# Patient Record
Sex: MALE | Race: BLACK OR AFRICAN AMERICAN | Employment: UNEMPLOYED | URBAN - METROPOLITAN AREA
[De-identification: names, ages, dates, MRNs, and addresses within clinical notes are randomized per-mention and may not be internally consistent; named-entity substitution may affect disease eponyms.]

---

## 2022-10-29 ENCOUNTER — HOSPITAL ENCOUNTER (EMERGENCY)
Age: 32
Discharge: HOME | End: 2022-10-29
Payer: MEDICAID

## 2022-10-29 VITALS
DIASTOLIC BLOOD PRESSURE: 95 MMHG | RESPIRATION RATE: 16 BRPM | OXYGEN SATURATION: 100 % | HEART RATE: 68 BPM | TEMPERATURE: 98.2 F | SYSTOLIC BLOOD PRESSURE: 139 MMHG

## 2022-10-29 DIAGNOSIS — R21 RASH AND OTHER NONSPECIFIC SKIN ERUPTION: Primary | ICD-10-CM

## 2022-10-29 PROCEDURE — 99282 EMERGENCY DEPT VISIT SF MDM: CPT

## 2022-10-29 ASSESSMENT — PAIN DESCRIPTION - LOCATION: LOCATION: ARM

## 2022-10-29 ASSESSMENT — PAIN SCALES - GENERAL: PAINLEVEL_OUTOF10: 4

## 2022-10-29 ASSESSMENT — PAIN DESCRIPTION - FREQUENCY: FREQUENCY: INTERMITTENT

## 2022-10-29 NOTE — DISCHARGE INSTRUCTIONS
It is unclear what is causing your rash today. I am concerned that it is related to an underlying systemic problem. Call and make an appointment to follow-up with dermatology for further evaluation soon as possible.

## 2022-10-29 NOTE — ED NOTES
#[id number]. Discharge packet reviewed with pt, including follow up care. Pt verbalized understanding and all questions answered.       Nelly Aragon RN  10/29/22 2240

## 2022-10-30 NOTE — ED PROVIDER NOTES
[address] ENCOUNTER        Pt Name: Muriel Vanegas  MRN: [id number]  [place] 1990  Date of evaluation: 10/29/2022  Provider: XAVIER Friend CNP  PCP: No primary care provider on file. ROBERTA. I have evaluated this patient. My supervising physician was available for consultation. Triage CHIEF COMPLAINT       Chief Complaint   Patient presents with    Rash     X6 months         HISTORY OF PRESENT ILLNESS      Chief Complaint: Rash    Muriel Vanegas is a 27 y.o. male who presents for evaluation of a progressive rash over his entire body that has been present for 6 months. Patient is [place] and reports that he came to the [address] [place] [address],[address] [facility] in February 2022. He migrated through [facility] for 2 years from [facility] on his way to the [address] [place] [address],[address] [place] [address]. He states that he had to crawl through bushes while migrating and got some scratches on his skin and was unsure if this could have contributed. He was evaluated for these when he first came to the [address] [place] [address],[address] [place] [address] in [place]. He also underwent testing for transmissible diseases and reports he specifically tested negative for HIV. He has no history of STIs. He states that the lesions initially start as ulcerated plaques and then progressed into dry, hypopigmented nodules. He has them diffusely over his extremities, buttocks, trunk, and is now developing them on his nose. He reports he is also developed similar lesions inside his nasal mucosa that frequently bleed and are uncomfortable. He has not received insurance since he has been in [place] and thus has not been seen by any primary care or dermatology. He denies any other systemic symptoms. Nursing Notes were all reviewed and agreed with or any disagreements were addressed in the HPI.     REVIEW OF SYSTEMS     Constitutional:   Denies fever, chills, weight loss or weakness   HENT:   Denies sore throat or ear pain   Cardiovascular:   Denies chest pain, palpitations   Respiratory: Denies cough or shortness of breath    GI:   Denies abdominal pain, nausea, vomiting, or diarrhea  :  Denies any urinary symptoms   Musculoskeletal:   Denies neck, back, or extremity pain  Skin:   Denies rash  Neurologic:  Denies headache, focal weakness or sensory changes   Endocrine:  Denies polyuria or polydypsia   Lymphatic:  Denies swollen glands     PAST MEDICAL HISTORY   History reviewed. No pertinent past medical history. SURGICAL HISTORY   History reviewed. No pertinent surgical history. CURRENTMEDICATIONS     There are no discharge medications for this patient. ALLERGIES     Patient has no allergy information on record. FAMILYHISTORY     History reviewed. No pertinent family history. SOCIAL HISTORY       Social History     Socioeconomic History    Marital status: Single     Spouse name: None    Number of children: None    Years of education: None    Highest education level: None   Tobacco Use    Smoking status: Never    Smokeless tobacco: Never   Substance and Sexual Activity    Alcohol use: Never       SCREENINGS           PHYSICAL EXAM       ED Triage Vitals [10/29/22 1553]   BP Temp Temp Source Heart Rate Resp SpO2 Height Weight   (!) 141/87 98.2 °F (36.8 °C) Oral 70 16 100 % -- --      Constitutional:  Well developed, Well nourished. No distress  HENT:  Normocephalic, Atraumatic. Moist mucus membranes. There is hypertrophied tissue and bilateral nasal mucosa that is friable, scabbed, with evidence of recent bleeding. There is no obstruction of the nasal passages. No mucus drainage. Neck/Lymphatics: supple, no JVD, no swollen nodes over neck or supraclavicular area. Cardiovascular:   RRR,  no murmurs/rubs/gallops. Respiratory:   Nonlabored breathing. Normal breath sounds, No wheezing  Abdomen: Bowel sounds normal, Soft, No tenderness, no masses. Musculoskeletal:  There is no peripheral edema.   Bilateral upper and lower extremity ROM intact without pain or obvious deficit  Integument: There are scattered hypopigmented, dry nodular lesions noted in clusters over bilateral upper extremities as well as lower extremities, clustered most densely near the ankles, dense cluster on the buttocks, and scattered lesions across the trunk. None of these appear ulcerated. There are 2 small lesions starting on the surface of the tip of the nose. There is no erythema or exudate. Neurologic:  Alert & oriented , No focal deficits noted. Sensation intact. Psychiatric:  Affect normal, Mood normal.     DIAGNOSTIC RESULTS   LABS:    Labs Reviewed - No data to display    When ordered, only abnormal lab results are displayed. All other labs were within normal range or not returned as of this dictation. EKG: When ordered, EKG's are interpreted by the Emergency Department Physician in the absence of a cardiologist.  Please see their note for interpretation of EKG. RADIOLOGY:   Non-plain film images such as CT, Ultrasound and MRI are read by the radiologist. Plain radiographic images are visualized and preliminarily interpreted by the  ED Provider with the below findings:    Interpretation perthe Radiologist below, if available at the time of this note:    No orders to display     No results found. PROCEDURES   Unless otherwise noted below, none         CRITICAL CARE   CRITICAL CARE NOTE:   N/A    CONSULTS:  None      EMERGENCY DEPARTMENT COURSE and MDM:   Vitals:    Vitals:    10/29/22 1553 10/29/22 1632   BP: (!) 141/87 (!) 139/95   Pulse: 70 68   Resp: 16    Temp: 98.2 °F (36.8 °C)    TempSrc: Oral    SpO2: 100% 100%       Patient was given thefollowing medications:  Medications - No data to display      Is this patient to be included in the SEP-1 Core Measure due to severe sepsis or septic shock? No   Exclusion criteria - the patient is NOT to be included for SEP-1 Core Measure due to: Infection is not suspected    MDM:  Patient presents as above. Emergent etiologies considered. Patient seen and examined. Work-up initiated secondary to presentation, physical exam findings, vital signs and medical chart review. In brief, patient presents for evaluation of a rash for the last 6 months. Advised patient that it is unclear what is causing this rash. I suspect that there is an underlying systemic or autoimmune pathology. Advised patient that he would need to be seen by dermatology for further evaluation and possible sampling of the lesions. He was seen by registration and given information to consult financial services for [facility] application. Provided him multiple options for dermatology follow-up as well as primary care. Patient verbalized understanding and agreement and felt comfortable with discharge at this time. CLINICAL IMPRESSION      1. Rash and other nonspecific skin eruption          DISPOSITION/PLAN   DISPOSITION Decision To Discharge 10/29/2022 05:07:16 PM      PATIENT REFERREDTO:  [facility] - ADULT  Sunil Deborah [address]  [phone]    Call to schedule an appointment immediately. [address] Dermatology  [facility] [phone]  [phone]  Schedule an appointment as soon as possible for a visit       Parvez Khan DO  [address]  [phone]    Schedule an appointment as soon as possible for a visit       DISCHARGE MEDICATIONS:  There are no discharge medications for this patient. DISCONTINUED MEDICATIONS:  There are no discharge medications for this patient.              (Please note that portions ofthis note were completed with a voice recognition program.  Efforts were made to edit the dictations but occasionally words are mis-transcribed.)    XAVIER Briceño CNP (electronically signed)             XAVIER Scott CNP  10/30/22 1068

## 2022-11-01 ENCOUNTER — HOSPITAL ENCOUNTER (EMERGENCY)
Age: 32
Discharge: HOME | End: 2022-11-02
Payer: MEDICAID

## 2022-11-01 VITALS
DIASTOLIC BLOOD PRESSURE: 65 MMHG | TEMPERATURE: 98.5 F | RESPIRATION RATE: 16 BRPM | SYSTOLIC BLOOD PRESSURE: 138 MMHG | OXYGEN SATURATION: 99 % | HEART RATE: 78 BPM

## 2022-11-01 DIAGNOSIS — R21 RASH AND OTHER NONSPECIFIC SKIN ERUPTION: Primary | ICD-10-CM

## 2022-11-01 LAB
ANION GAP SERPL CALCULATED.3IONS-SCNC: 11 MMOL/L (ref 4–16)
BASOPHILS ABSOLUTE: 0 K/CU MM
BASOPHILS RELATIVE PERCENT: 0.4 % (ref 0–1)
BUN BLDV-MCNC: 11 MG/DL (ref 6–23)
CALCIUM SERPL-MCNC: 9.3 MG/DL (ref 8.3–10.6)
CHLORIDE BLD-SCNC: 103 MMOL/L (ref 99–110)
CO2: 23 MMOL/L (ref 21–32)
CREAT SERPL-MCNC: 0.9 MG/DL (ref 0.9–1.3)
DIFFERENTIAL TYPE: ABNORMAL
EOSINOPHILS ABSOLUTE: 0.1 K/CU MM
EOSINOPHILS RELATIVE PERCENT: 2.1 % (ref 0–3)
GFR SERPL CREATININE-BSD FRML MDRD: >60 ML/MIN/1.73M2
GLUCOSE BLD-MCNC: 85 MG/DL (ref 70–99)
HCT VFR BLD CALC: 42.9 % (ref 42–52)
HEMOGLOBIN: 14 GM/DL (ref 13.5–18)
HIV SCREEN: NON REACTIVE
IMMATURE NEUTROPHIL %: 0.2 % (ref 0–0.43)
LYMPHOCYTES ABSOLUTE: 1.7 K/CU MM
LYMPHOCYTES RELATIVE PERCENT: 35.5 % (ref 24–44)
MCH RBC QN AUTO: 26.5 PG (ref 27–31)
MCHC RBC AUTO-ENTMCNC: 32.6 % (ref 32–36)
MCV RBC AUTO: 81.1 FL (ref 78–100)
MONOCYTES ABSOLUTE: 0.5 K/CU MM
MONOCYTES RELATIVE PERCENT: 11.2 % (ref 0–4)
NUCLEATED RBC %: 0 %
PDW BLD-RTO: 14.6 % (ref 11.7–14.9)
PLATELET # BLD: 213 K/CU MM (ref 140–440)
PMV BLD AUTO: 12 FL (ref 7.5–11.1)
POTASSIUM SERPL-SCNC: 3.9 MMOL/L (ref 3.5–5.1)
RAPID INFLUENZA  B AGN: NEGATIVE
RAPID INFLUENZA A AGN: NEGATIVE
RBC # BLD: 5.29 M/CU MM (ref 4.6–6.2)
SARS-COV-2, NAAT: NOT DETECTED
SEGMENTED NEUTROPHILS ABSOLUTE COUNT: 2.4 K/CU MM
SEGMENTED NEUTROPHILS RELATIVE PERCENT: 50.6 % (ref 36–66)
SODIUM BLD-SCNC: 137 MMOL/L (ref 135–145)
SOURCE: NORMAL
TOTAL IMMATURE NEUTOROPHIL: 0.01 K/CU MM
TOTAL NUCLEATED RBC: 0 K/CU MM
WBC # BLD: 4.8 K/CU MM (ref 4–10.5)

## 2022-11-01 PROCEDURE — 99283 EMERGENCY DEPT VISIT LOW MDM: CPT | Performed by: PHYSICIAN ASSISTANT

## 2022-11-01 PROCEDURE — 87804 INFLUENZA ASSAY W/OPTIC: CPT

## 2022-11-01 PROCEDURE — 80048 BASIC METABOLIC PNL TOTAL CA: CPT

## 2022-11-01 PROCEDURE — 85025 COMPLETE CBC W/AUTO DIFF WBC: CPT

## 2022-11-01 PROCEDURE — 87389 HIV-1 AG W/HIV-1&-2 AB AG IA: CPT

## 2022-11-01 PROCEDURE — 87635 SARS-COV-2 COVID-19 AMP PRB: CPT

## 2022-11-02 NOTE — ED PROVIDER NOTES
Triage Chief Complaint:   Nasal Congestion and Rash    [place]:  Eric Silvestre is a 27 y.o. male that presents A for rash. Context is is pt has had a rash x 8 months. From [place] originally however developed rash in [place]. [place] the [place]. States the rash will peel and then pop up elsewhere. Endorses itching but no pain. No contacts with said rash. Was seen in [place] and Rx Bactroban with little effect. No involvement of palms, soles, mouth, or genitals, or anus. No new medications  No new foods  Immunizations are up-to-date      ROS:  REVIEW OF SYSTEMS    Constitutional:  Denies fever, chills, weight loss or weakness   HENT:  Denies sore throat or ear pain   Cardiovascular:  Denies chest pain, palpitations   Respiratory:  Denies cough or shortness of breath    GI:  Denies abdominal pain, nausea, vomiting, or diarrhea    Musculoskeletal:  Denies back pain,   Skin:  + rash  Neurologic:  Denies headache, focal weakness or sensory changes   Endocrine:  Denies polyuria or polydypsia   Lymphatic:  Denies swollen glands     All other review of systems are negative  See HPI and nursing notes for additional information       History reviewed. No pertinent past medical history. History reviewed. No pertinent surgical history. History reviewed. No pertinent family history.   Social History     Socioeconomic History    Marital status: Single     Spouse name: Not on file    Number of children: Not on file    Years of education: Not on file    Highest education level: Not on file   Occupational History    Not on file   Tobacco Use    Smoking status: Never    Smokeless tobacco: Never   Substance and Sexual Activity    Alcohol use: Never    Drug use: Not on file    Sexual activity: Not on file   Other Topics Concern    Not on file   Social History Narrative    Not on file     Social Determinants of Health     Financial Resource Strain: Not on file   Food Insecurity: Not on file   Transportation Needs: Not on file   Physical Activity: Not on file   Stress: Not on file   Social Connections: Not on file   Intimate Partner Violence: Not on file   Housing Stability: Not on file     No current facility-administered medications for this encounter. Current Outpatient Medications   Medication Sig Dispense Refill    mupirocin (BACTROBAN) 2 % ointment Apply topically 3 times daily. 1 g 0     No Known Allergies    Nursing Notes Reviewed    Physical Exam:  ED Triage Vitals [11/01/22 9237]   Enc Vitals Group      BP (!) 142/70      Heart Rate 74      Resp 18      Temp 98.5 °F (36.9 °C)      Temp Source Oral      SpO2 99 %      Weight       Height       Head Circumference       Peak Flow       Pain Score       Pain Loc       Pain Edu? Excl. in [address]? General :Patient is awake alert oriented person place and time no acute distress nontoxic appearing  HEENT: Pupils are equally round and reactive to light extraocular motors are intact conjunctivae clear sclerae white there is no injection no icterus. Nose without any rhinorrhea or epistaxis. Oral mucosa is moist no exudate buccal mucosa shows no ulcerations. Neck: Neck is supple full range of motion  Cardiac: Heart regular rate rhythm no murmurs rubs clicks or gallops  Lungs: Lungs are clear to auscultation there is no wheezing rhonchi or rales. There is no use of accessory muscles no nasal flaring identified. Abdomen: Abdomen is soft nontender nondistended. There is no firm or pulsatile masses no rebound rigidity or guarding negative Richey's negative McBurney, no peritoneal signs  Suprapubic:  there is no tenderness to palpation over the external bladder   Musculoskeletal: 5 out of 5 strength in all 4 extremities full flexion extension abduction and adduction supination pronation of all extremities and all digits. No obvious muscle atrophy is noted. No focal muscle deficits are appreciated  Neuro:  Motor intact sensory intact cranial nerves II through XII are intact level of consciousness is normal   Dermatology: Skin is warm and dry there is no obvious abscesses or lacerations  Rash: along pt bilat UE, Le, Nose, back and trunk: diffuse lesional rash. Each lesion between 0.5-2cm in diameter, highly keratonized and scabbed with dome shapes. No surrounding edema or erythema. Some of the lesions which have dacia deroofed reveal cavernous wounds without bleeding or discharge. Within thenares there are noted multiple small scabs. Lymphatic: There is no submandibular or cervical adenopathy appreciated.   Psych: Mentation is grossly normal cognition is grossly normal. Affect is appropriate      I have reviewed and interpreted all of the currently available lab results from this visit (if applicable):  Results for orders placed or performed during the hospital encounter of 11/01/22   Rapid Flu Swab    Specimen: Nasopharyngeal   Result Value Ref Range    Rapid Influenza A Ag NEGATIVE NEGATIVE    Rapid Influenza B Ag NEGATIVE NEGATIVE   COVID-19, Rapid    Specimen: Nasopharyngeal   Result Value Ref Range    Source UNKNOWN     SARS-CoV-2, NAAT NOT DETECTED NOT DETECTED   CBC with Auto Differential   Result Value Ref Range    WBC 4.8 4.0 - 10.5 K/CU MM    RBC 5.29 4.6 - 6.2 M/CU MM    Hemoglobin 14.0 13.5 - 18.0 GM/DL    Hematocrit 42.9 42 - 52 %    MCV 81.1 78 - 100 FL    MCH 26.5 (L) 27 - 31 PG    MCHC 32.6 32.0 - 36.0 %    RDW 14.6 11.7 - 14.9 %    Platelets 114 195 - 801 K/CU MM    MPV 12.0 (H) 7.5 - 11.1 FL    Differential Type AUTOMATED DIFFERENTIAL     Segs Relative 50.6 36 - 66 %    Lymphocytes % 35.5 24 - 44 %    Monocytes % 11.2 (H) 0 - 4 %    Eosinophils % 2.1 0 - 3 %    Basophils % 0.4 0 - 1 %    Segs Absolute 2.4 K/CU MM    Lymphocytes Absolute 1.7 K/CU MM    Monocytes Absolute 0.5 K/CU MM    Eosinophils Absolute 0.1 K/CU MM    Basophils Absolute 0.0 K/CU MM    Nucleated RBC % 0.0 %    Total Nucleated RBC 0.0 K/CU MM    Total Immature Neutrophil 0.01 K/CU MM    Immature Neutrophil % 0.2 0 - 0.43 %   Basic Metabolic Panel   Result Value Ref Range    Sodium 137 135 - 145 MMOL/L    Potassium 3.9 3.5 - 5.1 MMOL/L    Chloride 103 99 - 110 mMol/L    CO2 23 21 - 32 MMOL/L    Anion Gap 11 4 - 16    BUN 11 6 - 23 MG/DL    Creatinine 0.9 0.9 - 1.3 MG/DL    Est, Glom Filt Rate >60 >60 mL/min/1.73m2    Glucose 85 70 - 99 MG/DL    Calcium 9.3 8.3 - 10.6 MG/DL   HIV Screen   Result Value Ref Range    HIV Screen NON REACTIVE NON REACTIVE      Radiographs (if obtained):  [] The following radiograph was interpreted by myself in the absence of a radiologist:   [] Radiologist's Report Reviewed:  No orders to display       EKG (if obtained):   Please See Note of attending physician for EKG interpretation. Chart review shows recent radiograph(s):  No results found. MDM:   Patient presents today with rash of unknown etiology. No constitutional sx. I have low suspicion of emergent process or systemic involement. Rash is chronic (8+ months) will provide derm referral        Differential diagnosis: Vasculitis, bacterial skin infection, viral rash, systemic infectious rash, anaphylaxis, urticaria, exposure to poison ivy or poison oak or other sources of contact dermatitis, vasculitis, bacterial skin infection , viral rash, systemic infectious rash, Anaphylaxis, Urticaria, other  Differential diagnosis includes necrotizing fasciitis, Moreno Tony syndrome,  B zoster, varicella-zoster, cellulitis, others others      Pt is to be discharged home. Pt is  to return immediately to the emergency department if he has any new, worrisome or worsening symptoms. Pt is to follow up with PCP within 2 days. Patient/Surrogate vocalizes agreement and understanding with this plan and he has no questions upon disposition. Pt is comfortable upon disposition home. Patient is stable, Patients vital signs are stable. Vital signs and nursing notes reviewed during ED course.  I independently managed patient today in the ED.        All pertinent Lab data and radiographic results reviewed with patient at bedside. The patient and/or the family were informed of the results of any tests/labs/imaging, the treatment plan, and time was allotted to answer questions. See chart for details of medications given during the ED stay. /65   Pulse 78   Temp 98.5 °F (36.9 °C) (Oral)   Resp 16   SpO2 99%       Clinical Impression:  1. Rash and other nonspecific skin eruption        Disposition referral (if applicable): Address: [address] [place] [address], [facility], [facility]  Areas served: [facility] and nearby areas  Hours:   Closed · Davion Jauregui  Phone: [phone]  In 1 day    Disposition medications (if applicable): There are no discharge medications for this patient. Comment: Please note this report has been produced using speech recognition software and may contain errors related to that system including errors in grammar, punctuation, and spelling, as well as words and phrases that may be inappropriate. If there are any questions or concerns please feel free to contact the dictating provider for clarification.       Caryl Mckinney, [phone]  11/08/22 5109

## 2022-12-15 ENCOUNTER — HOSPITAL ENCOUNTER (EMERGENCY)
Age: 32
Discharge: HOME | End: 2022-12-15
Payer: MEDICAID

## 2022-12-15 VITALS
OXYGEN SATURATION: 100 % | DIASTOLIC BLOOD PRESSURE: 72 MMHG | RESPIRATION RATE: 15 BRPM | HEART RATE: 82 BPM | TEMPERATURE: 97.8 F | SYSTOLIC BLOOD PRESSURE: 131 MMHG

## 2022-12-15 DIAGNOSIS — R21 RASH AND OTHER NONSPECIFIC SKIN ERUPTION: Primary | ICD-10-CM

## 2022-12-15 LAB
ANION GAP SERPL CALCULATED.3IONS-SCNC: 10 MMOL/L (ref 4–16)
BASOPHILS ABSOLUTE: 0 K/CU MM
BASOPHILS RELATIVE PERCENT: 0.8 % (ref 0–1)
BUN BLDV-MCNC: 14 MG/DL (ref 6–23)
CALCIUM SERPL-MCNC: 9.7 MG/DL (ref 8.3–10.6)
CHLORIDE BLD-SCNC: 99 MMOL/L (ref 99–110)
CO2: 27 MMOL/L (ref 21–32)
CREAT SERPL-MCNC: 1 MG/DL (ref 0.9–1.3)
DIFFERENTIAL TYPE: ABNORMAL
EOSINOPHILS ABSOLUTE: 0.1 K/CU MM
EOSINOPHILS RELATIVE PERCENT: 1.9 % (ref 0–3)
GFR SERPL CREATININE-BSD FRML MDRD: >60 ML/MIN/1.73M2
GLUCOSE BLD-MCNC: 104 MG/DL (ref 70–99)
HCT VFR BLD CALC: 40.1 % (ref 42–52)
HEMOGLOBIN: 13.4 GM/DL (ref 13.5–18)
IMMATURE NEUTROPHIL %: 0 % (ref 0–0.43)
LYMPHOCYTES ABSOLUTE: 1 K/CU MM
LYMPHOCYTES RELATIVE PERCENT: 28.1 % (ref 24–44)
MCH RBC QN AUTO: 26.8 PG (ref 27–31)
MCHC RBC AUTO-ENTMCNC: 33.4 % (ref 32–36)
MCV RBC AUTO: 80.2 FL (ref 78–100)
MONOCYTES ABSOLUTE: 0.5 K/CU MM
MONOCYTES RELATIVE PERCENT: 12.5 % (ref 0–4)
NUCLEATED RBC %: 0 %
PDW BLD-RTO: 13.9 % (ref 11.7–14.9)
PLATELET # BLD: 195 K/CU MM (ref 140–440)
PMV BLD AUTO: 11.4 FL (ref 7.5–11.1)
POTASSIUM SERPL-SCNC: 4 MMOL/L (ref 3.5–5.1)
RBC # BLD: 5 M/CU MM (ref 4.6–6.2)
SEGMENTED NEUTROPHILS ABSOLUTE COUNT: 2.1 K/CU MM
SEGMENTED NEUTROPHILS RELATIVE PERCENT: 56.7 % (ref 36–66)
SODIUM BLD-SCNC: 136 MMOL/L (ref 135–145)
TOTAL IMMATURE NEUTOROPHIL: 0 K/CU MM
TOTAL NUCLEATED RBC: 0 K/CU MM
WBC # BLD: 3.7 K/CU MM (ref 4–10.5)

## 2022-12-15 PROCEDURE — 6370000000 HC RX 637 (ALT 250 FOR IP): Performed by: NURSE PRACTITIONER

## 2022-12-15 PROCEDURE — 80048 BASIC METABOLIC PNL TOTAL CA: CPT

## 2022-12-15 PROCEDURE — 86592 SYPHILIS TEST NON-TREP QUAL: CPT

## 2022-12-15 PROCEDURE — 85025 COMPLETE CBC W/AUTO DIFF WBC: CPT

## 2022-12-15 PROCEDURE — 99283 EMERGENCY DEPT VISIT LOW MDM: CPT

## 2022-12-15 RX ORDER — DOXYCYCLINE HYCLATE 100 MG
100 TABLET ORAL 2 TIMES DAILY
Qty: 14 TABLET | Refills: 0 | Status: SHIPPED | OUTPATIENT
Start: 2022-12-15 | End: 2022-12-22

## 2022-12-15 RX ORDER — DOXYCYCLINE HYCLATE 100 MG
100 TABLET ORAL ONCE
Status: COMPLETED | OUTPATIENT
Start: 2022-12-15 | End: 2022-12-15

## 2022-12-15 RX ORDER — FLUCONAZOLE 100 MG/1
150 TABLET ORAL ONCE
Status: COMPLETED | OUTPATIENT
Start: 2022-12-15 | End: 2022-12-15

## 2022-12-15 RX ORDER — ACETAMINOPHEN 325 MG/1
650 TABLET ORAL ONCE
Status: DISCONTINUED | OUTPATIENT
Start: 2022-12-15 | End: 2022-12-15

## 2022-12-15 RX ORDER — DOXYCYCLINE HYCLATE 100 MG
100 TABLET ORAL EVERY 12 HOURS SCHEDULED
Status: DISCONTINUED | OUTPATIENT
Start: 2022-12-15 | End: 2022-12-15

## 2022-12-15 RX ADMIN — DOXYCYCLINE HYCLATE 100 MG: 100 TABLET, COATED ORAL at 15:29

## 2022-12-15 RX ADMIN — FLUCONAZOLE 150 MG: 100 TABLET ORAL at 15:29

## 2022-12-15 NOTE — ED PROVIDER NOTES
EMERGENCY DEPARTMENT ENCOUNTER      PCP: No primary care provider on file. CHIEF COMPLAINT    Chief Complaint   Patient presents with    Medication Refill     PT requesting medication refill on ointment given to help with rash all over body. This patient was not evaluated by the attending physician. I have independently evaluated this patient . HPI    Dior Anand is a 27 y.o. male who presents with a rash over 6 months ago. States while he was at a hospital in [place] they gave him fluconazole and his symptoms improved, however shortly after he finished, they returned. He states he saw a \"skin doctor\" and they told him the rash was from his inside not his skin. The rash is painful. There is scattered across his bodies however no lesions on his or soles. States the pain is moderate, tender, and constant. Palpation exacerbates his symptoms, nothing has alleviated his symptoms. No new product use, contact with plants, new foods, new medications. He denies shortness of breath, fevers, or other complaints. He was told to use selsun blue on the area daily. REVIEW OF SYSTEMS    General: Denies fevers or syncope  ENT: Denies throat swelling or tongue swelling  Pulmonary: Denies wheezes, difficulty breathing,  or chest tightness  Skin: See HPI    All other review of systems are negative  See HPI and nursing notes for additional information     PAST MEDICAL & SURGICAL HISTORY    History reviewed. No pertinent past medical history. History reviewed. No pertinent surgical history.     CURRENT MEDICATIONS        ALLERGIES    No Known Allergies    SOCIAL & FAMILY HISTORY    Social History     Socioeconomic History    Marital status: Single     Spouse name: None    Number of children: None    Years of education: None    Highest education level: None   Tobacco Use    Smoking status: Never    Smokeless tobacco: Never   Substance and Sexual Activity    Alcohol use: Never     No family history on file.    PHYSICAL EXAM    VITAL SIGNS: /72   Pulse 82   Temp 97.8 °F (36.6 °C)   Resp 15   SpO2 100%   Constitutional:  Well developed, well nourished  HENT:  Atraumatic, no facial or lip swelling  ORAL EXAM:   No tongue swelling, airway patent/Throat is clear  Neck/Lymphatics: supple, no JVD, no swollen nodes  Respiratory:  Nonlabored breathing. Lungs clear. Cardiovascular:  No JVD   Musculoskeletal:  No edema, no acute deformities. Integument: Patient has multiple scattered lesions that annular, dry and flaking with some central pustules. No areas of fluctuance or induration. No surrounding erythema or warmth.   Lesions are on his chest, back, arms, legs, and nose    LABS:  Results for orders placed or performed during the hospital encounter of 12/15/22   CBC with Auto Differential   Result Value Ref Range    WBC 3.7 (L) 4.0 - 10.5 K/CU MM    RBC 5.00 4.6 - 6.2 M/CU MM    Hemoglobin 13.4 (L) 13.5 - 18.0 GM/DL    Hematocrit 40.1 (L) 42 - 52 %    MCV 80.2 78 - 100 FL    MCH 26.8 (L) 27 - 31 PG    MCHC 33.4 32.0 - 36.0 %    RDW 13.9 11.7 - 14.9 %    Platelets 727 949 - 209 K/CU MM    MPV 11.4 (H) 7.5 - 11.1 FL    Differential Type AUTOMATED DIFFERENTIAL     Segs Relative 56.7 36 - 66 %    Lymphocytes % 28.1 24 - 44 %    Monocytes % 12.5 (H) 0 - 4 %    Eosinophils % 1.9 0 - 3 %    Basophils % 0.8 0 - 1 %    Segs Absolute 2.1 K/CU MM    Lymphocytes Absolute 1.0 K/CU MM    Monocytes Absolute 0.5 K/CU MM    Eosinophils Absolute 0.1 K/CU MM    Basophils Absolute 0.0 K/CU MM    Nucleated RBC % 0.0 %    Total Nucleated RBC 0.0 K/CU MM    Total Immature Neutrophil 0.00 K/CU MM    Immature Neutrophil % 0.0 0 - 0.43 %   Basic Metabolic Panel   Result Value Ref Range    Sodium 136 135 - 145 MMOL/L    Potassium 4.0 3.5 - 5.1 MMOL/L    Chloride 99 99 - 110 mMol/L    CO2 27 21 - 32 MMOL/L    Anion Gap 10 4 - 16    BUN 14 6 - 23 MG/DL    Creatinine 1.0 0.9 - 1.3 MG/DL    Est, Glom Filt Rate >60 >60 mL/min/1.73m2 Glucose 104 (H) 70 - 99 MG/DL    Calcium 9.7 8.3 - 10.6 MG/DL         ED COURSE & MEDICAL DECISION MAKING        68-year-old male presents emergency department with complaints of a painful rash for the past 6 months. The rash annular, dry, and flaking with some pustules. Basic labs were obtained. Syphilis is pending. No leukocytosis, No severe electrolyte derangement. He states he had a course of fluconazole previously which cleared up the rash. He was given diflucan 150 mg po and sent home with a prescription for doxycycline. He was instructed to follow-up with a primary care provider within a week and call today to schedule an appointment. He was instructed previously in [place] to use selsun blue on the rash and I encouraged him to continue doing this. He was instructed to return to the emergency department immediately with any worsening symptoms. He verbalized understanding and agrees with plan of care. He was given information for provider accepting new patients. He is afebrile and appears non toxic. The etiology is like fungal with possible underlying infection. At this time, I do feel he can be managed out patient with follow up with primary care provider, he was instructed to call to schedule today tor tomorrow. Video  utilized for visit. Return to emergency Department warning signs discussed in detail with patient today who understands and agrees including but not limited to worsening rash,fever, mouth/tongue/lip swelling, trouble breathing or swallowing, or any new symptoms not present today. Vital signs and nursing notes reviewed during ED course.        Differential diagnosis: Vasculitis, bacterial skin infection, viral rash, systemic infectious rash, anaphylaxis, urticaria, exposure to poison ivy or poison oak or other sources of contact dermatitis, bacterial skin infection , viral rash, systemic infectious rash, Anaphylaxis, Urticaria, other        Clinical IMPRESSION    1. Rash and other nonspecific skin eruption                Comment: Please note this report has been produced using speech recognition software and may contain errors related to that system including errors in grammar, punctuation, and spelling, as well as words and phrases that may be inappropriate. If there are any questions or concerns please feel free to contact the dictating provider for clarification.        XAVIER Martins CNP  12/15/22 1692       XAVIER Martins CNP  12/15/22 6217

## 2022-12-15 NOTE — DISCHARGE INSTRUCTIONS
Take doxycycline as directed until gone. Continue using selsun blue as directed. Follow-up with primary care provider next week, call to schedule an appointment. Return to the emergency department with worsening symptoms.

## 2022-12-15 NOTE — Clinical Note
Jonah Lopez was seen and treated in our emergency department on 12/15/2022. He may return to work on 12/16/2022. If you have any questions or concerns, please don't hesitate to call.       Hussein Nix, XAVIER - CNP

## 2022-12-16 LAB — RPR: NON REACTIVE

## 2023-02-09 ENCOUNTER — HOSPITAL ENCOUNTER (EMERGENCY)
Age: 33
Discharge: HOME | End: 2023-02-09
Attending: EMERGENCY MEDICINE
Payer: MEDICAID

## 2023-02-09 VITALS
HEART RATE: 78 BPM | SYSTOLIC BLOOD PRESSURE: 142 MMHG | TEMPERATURE: 98.4 F | RESPIRATION RATE: 18 BRPM | DIASTOLIC BLOOD PRESSURE: 92 MMHG | OXYGEN SATURATION: 99 %

## 2023-02-09 DIAGNOSIS — Z20.822 LAB TEST NEGATIVE FOR COVID-19 VIRUS: Primary | ICD-10-CM

## 2023-02-09 DIAGNOSIS — J34.89 NASAL SORE: ICD-10-CM

## 2023-02-09 LAB
SARS-COV-2 RDRP RESP QL NAA+PROBE: NOT DETECTED
SOURCE: NORMAL

## 2023-02-09 PROCEDURE — 87635 SARS-COV-2 COVID-19 AMP PRB: CPT

## 2023-02-09 PROCEDURE — 99283 EMERGENCY DEPT VISIT LOW MDM: CPT | Performed by: EMERGENCY MEDICINE

## 2023-02-09 RX ORDER — DOXYCYCLINE HYCLATE 100 MG
100 TABLET ORAL 2 TIMES DAILY
Qty: 20 TABLET | Refills: 0 | Status: SHIPPED | OUTPATIENT
Start: 2023-02-09 | End: 2023-02-19

## 2023-02-09 RX ORDER — CHLORHEXIDINE GLUCONATE 4 G/100ML
SOLUTION TOPICAL
Qty: 236 ML | Refills: 0 | Status: SHIPPED | OUTPATIENT
Start: 2023-02-09 | End: 2023-02-23

## 2023-02-09 RX ORDER — MUPIROCIN CALCIUM 20 MG/G
CREAM TOPICAL
Qty: 15 G | Refills: 0 | Status: SHIPPED | OUTPATIENT
Start: 2023-02-09 | End: 2023-03-11

## 2023-02-09 NOTE — DISCHARGE INSTRUCTIONS
Establish and follow-up with a primary care physician for reevaluation. Call for an appointment  Take doxycycline antibiotic as prescribed and directed for recurrent wounds  Apply Bactroban to bilateral nares for sores in the nares  Use Hibiclens antibacterial soap daily as needed  Return to the emergency department any pain fever chills nausea vomiting has not had any worsening symptoms.

## 2023-02-09 NOTE — ED PROVIDER NOTES
Emergency Department Encounter    Patient: Jeffery Wyman  MRN: [id number]  : 1990  Date of Evaluation: 2023  ED Provider:  Cruz Granados DO    Triage Chief Complaint:   Covid Testing    [place]:  Jeffery Wyman is a 27 y.o. male with no chronic medical illnesses that presents to the emergency department complaint of sores in his nose. Patient is [place] speaking and  used for this ER visit. Patient gives a history. Patient requesting doxycycline antibiotic he states they usually help. Patient states he developed the sores all over his body. He states he used to get medicine when he lived in [place]. He states he got medicine during last ER visit doxycycline and it did help. Patient states dry and crusting inside of his nose. Patient denies any nosebleed. States he does not have a primary care physician. Patient any chest pain shortness breath nausea vomiting diarrhea fever chills cough sore throat runny nose earache. Patient here for evaluation. ROS - see HPI, below listed is current ROS at time of my eval:  General:  No fevers, no chills, no weakness  Eyes:  No recent vison changes, no discharge  ENT: Positive for nasal sores, no sore throat, no nasal congestion, no hearing changes  Cardiovascular:  No chest pain, no palpitations  Respiratory:  No shortness of breath, no cough, no wheezing  Gastrointestinal:  No pain, no nausea, no vomiting, no diarrhea  Musculoskeletal:  No muscle pain, no joint pain  Skin:  No rash, no pruritis, no easy bruising  Neurologic:  No speech problems, no headache, no extremity numbness, no extremity tingling, no extremity weakness  Psychiatric:  No anxiety  Genitourinary:  No dysuria, no hematuria  Endocrine:  No unexpected weight gain, no unexpected weight loss  Extremities:  no edema, no pain    No past medical history on file. No past surgical history on file. No family history on file.   Social History     Socioeconomic History Marital status: Single     Spouse name: Not on file    Number of children: Not on file    Years of education: Not on file    Highest education level: Not on file   Occupational History    Not on file   Tobacco Use    Smoking status: Never    Smokeless tobacco: Never   Substance and Sexual Activity    Alcohol use: Never    Drug use: Not on file    Sexual activity: Not on file   Other Topics Concern    Not on file   Social History Narrative    Not on file     Social Determinants of Health     Financial Resource Strain: Not on file   Food Insecurity: Not on file   Transportation Needs: Not on file   Physical Activity: Not on file   Stress: Not on file   Social Connections: Not on file   Intimate Partner Violence: Not on file   Housing Stability: Not on file     No current facility-administered medications for this encounter. Current Outpatient Medications   Medication Sig Dispense Refill    doxycycline hyclate (VIBRA-TABS) 100 MG tablet Take 1 tablet by mouth 2 times daily for 10 days 20 tablet 0    mupirocin (BACTROBAN) 2 % cream Apply topically 3 times daily. 15 g 0    chlorhexidine (HIBICLENS) 4 % external liquid Apply topically daily as needed. 236 mL 0     No Known Allergies    Nursing Notes Reviewed    Physical Exam:  Triage VS:    ED Triage Vitals [02/09/23 1412]   Enc Vitals Group      BP (!) 117/103      Heart Rate 74      Resp 20      Temp 98.4 °F (36.9 °C)      Temp Source Oral      SpO2 100 %      Weight       Height       Head Circumference       Peak Flow       Pain Score       Pain Loc       Pain Edu? Excl. in [address]? My pulse ox interpretation is - normal    General appearance:  No acute distress. Skin: Multiple chronic old abscesses that have healed all over the body arms and legs. No signs of cellulitis, no pus drainage discharge, no fluctuance, warm. Dry. Eye:  Extraocular movements intact.      Ears, nose, mouth and throat:  Oral mucosa moist, bilateral nares with dried crusted sores no abscess no septal hematoma with no nosebleeds, erythema of the nasal turbinates,  Neck:  Trachea midline. Extremity:  No swelling. Normal ROM     Heart:  Regular rate and rhythm, normal S1 & S2, no extra heart sounds. Perfusion:  intact  Respiratory:  Lungs clear to auscultation bilaterally. Respirations nonlabored. Abdominal:  Normal bowel sounds. Soft. Nontender. Non distended. Back:  No CVA tenderness to palpation     Neurological:  Alert and oriented times 3. No focal neuro deficits. Psychiatric:  Appropriate    I have reviewed and interpreted all of the currently available lab results from this visit (if applicable):  Results for orders placed or performed during the hospital encounter of 02/09/23   COVID-19, Rapid    Specimen: Nasopharyngeal   Result Value Ref Range    Source UNKNOWN     SARS-CoV-2, NAAT NOT DETECTED NOT DETECTED      Radiographs (if obtained):  Radiologist's Report Reviewed:  No orders to display         EKG (if obtained): (All EKG's are interpreted by myself in the absence of a cardiologist)      MDM:  Andres Millan is a 27 y.o. male with no chronic medical illnesses that presents to the emergency department complaint of sores in his nose. Patient is [place] speaking and  used for this ER visit. Patient gives a history. Patient requesting doxycycline antibiotic he states they usually help. Patient states he developed the sores all over his body. He states he used to get medicine when he lived in [place]. He states he got medicine during last ER visit doxycycline and it did help. Patient states dry and crusting inside of his nose. Patient denies any nosebleed. States he does not have a primary care physician. Patient any chest pain shortness breath nausea vomiting diarrhea fever chills cough sore throat runny nose earache. On physical exam patient has some dried crusted lesion about the nares, no abscess or septal hematomas. Patient does proceed to show me old abscesses that have dried crusted over multiple wounds he has had in the past.  I discussed with patient I can place him on doxycycline 100 mg twice a day for 10 days, Bactroban to apply to bilateral nares. Did suggest Hibiclens which she will be given a prescription antibacterial soap to help keep down any boils or abscesses that may recur. Patient was initially thought to need a COVID test which was ordered and is negative. Patient will be discharged to home. Patient was given the name to Dr. Briana Coombs and PCP to establish a primary care physician for reevaluation. All questions answered. Clinical Impression:  1. Lab test negative for COVID-19 virus    2. Nasal sore      Disposition referral (if applicable):  Harriett Lundborg, MD  [address]   [phone]    Schedule an appointment as soon as possible for a visit in 1 day  to establish a primary care physician for re-evaluation. call for an apppointment    [facility]  [place] [id number] [phone]  Go in 1 day  If symptoms worsen    Disposition medications (if applicable):  New Prescriptions    CHLORHEXIDINE (HIBICLENS) 4 % EXTERNAL LIQUID    Apply topically daily as needed. DOXYCYCLINE HYCLATE (VIBRA-TABS) 100 MG TABLET    Take 1 tablet by mouth 2 times daily for 10 days    MUPIROCIN (BACTROBAN) 2 % CREAM    Apply topically 3 times daily. ED Provider Disposition Time  DISPOSITION Decision To Discharge 02/09/2023 05:43:01 PM      Comment: Please note this report has been produced using speech recognition software and may contain errors related to that system including errors in grammar, punctuation, and spelling, as well as words and phrases that may be inappropriate. Efforts were made to edit the dictations. Kahlil Puri,   02/15/23 0903

## 2023-11-21 ENCOUNTER — HOSPITAL ENCOUNTER (EMERGENCY)
Age: 33
Discharge: HOME | End: 2023-11-21
Payer: MEDICAID

## 2023-11-21 VITALS
TEMPERATURE: 97.7 F | HEART RATE: 85 BPM | SYSTOLIC BLOOD PRESSURE: 144 MMHG | DIASTOLIC BLOOD PRESSURE: 75 MMHG | OXYGEN SATURATION: 99 % | RESPIRATION RATE: 20 BRPM

## 2023-11-21 DIAGNOSIS — L98.9 SKIN LESIONS: Primary | ICD-10-CM

## 2023-11-21 PROCEDURE — 99283 EMERGENCY DEPT VISIT LOW MDM: CPT

## 2023-11-21 RX ORDER — TRIAMCINOLONE ACETONIDE 55 UG/1
2 SPRAY, METERED NASAL DAILY
Qty: 6.8 EACH | Refills: 3 | Status: SHIPPED | OUTPATIENT
Start: 2023-11-21 | End: 2023-11-21 | Stop reason: SDUPTHER

## 2023-11-21 RX ORDER — TRIAMCINOLONE ACETONIDE 55 UG/1
2 SPRAY, METERED NASAL DAILY
Qty: 6.8 EACH | Refills: 3 | Status: SHIPPED | OUTPATIENT
Start: 2023-11-21

## 2023-11-21 NOTE — ED TRIAGE NOTES
Patient presents with complaint of bumps on his skin and in his nose. Patient states the ones in his nose obstruct his ability to breathe well through his nose.

## 2023-11-22 NOTE — DISCHARGE INSTRUCTIONS
Please contact a primary care provider to discuss your skin lesions and if needed for further evaluation. You will need to have a follow up appointment with a dermatologist for further evaluation and testing to rule out any concern for malignancy (cancer). Return with any fever, extremity swelling.

## 2023-11-22 NOTE — ED PROVIDER NOTES
**ADVANCED PRACTICE PROVIDER, I HAVE EVALUATED THIS PATIENT**        [facility] ENCOUNTER      Pt Name: Jelena Ndiaye  JBS:[id number]  [facility] 1990  Date of evaluation: 11/21/2023  Provider: Humberto Trinidad PA-C      Chief Complaint:    Chief Complaint   Patient presents with    Bumps on skin/in nose         Nursing Notes, Past Medical Hx, Past Surgical Hx, Social Hx, Allergies, and Family Hx were all reviewed and agreed with or any disagreements were addressed in the HPI. HISTORY OF PRESENT ILLNESS     History from : Patient    Limitations to history : None and Language- [place] Creole Speaking    Jelena Ndiaye is a 26 y.o. male who presents with concern for skin lesions on Bilateral UE and nose. X 6months. Described as constant. Occasionally it bleeds with scratching. Itches occasionally. Non painful . Feels it is in his nostrils. Has been seen previously. He tells me he has been seen several times here locally, and also in [place]. He describes being placed on IV medications at 1 point and had improvement. Denies injury, drainage, fevers, joint pain, sick contacts or travel members with similar rash        PastMedical/Surgical History:  History reviewed. No pertinent past medical history. History reviewed. No pertinent surgical history. Medications:  Previous Medications    No medications on file       , No history of immunocompromise conditions  Review of Systems:  (1 systems needed)  Pertinent positives and negatives are stated within HPI, all other systems reviewed and are negative. Review of Systems   HENT:  Positive for rhinorrhea. Musculoskeletal:  Positive for arthralgias. Skin:  Positive for rash. Physical Exam:  Physical Exam  Vitals and nursing note reviewed. Constitutional:       Appearance: Normal appearance. He is well-developed. He is not ill-appearing or diaphoretic.

## 2023-11-23 ASSESSMENT — ENCOUNTER SYMPTOMS: RHINORRHEA: 1

## 2024-02-07 ENCOUNTER — NURSE TRIAGE (OUTPATIENT)
Dept: OTHER | Facility: CLINIC | Age: 34
End: 2024-02-07

## 2024-02-07 NOTE — TELEPHONE ENCOUNTER
Location of patient: [place]    Received call from Linnea at [facility]; Patient with Red Flag Complaint requesting to establish care with [facility].    Subjective: Caller states \"I am making this appt for him because he is at work\"     Current Symptoms: stuffy nose bloody discharge from nose (streaks of blood), no other symptoms    Discoloration on the arm/back and belly (pinkish skin) - sometimes it itches, not raised    Onset: 2 years ago     Associated Symptoms: NA    Pain Severity: 0/10; N/A; none    Temperature: denies       What has been tried:  Allergy meds    LMP: NA Pregnant: NA    Recommended disposition: See in Office Today or Tomorrow (patient will be seen in UC or walk in care if no new pt appts)     Care advice provided, patient verbalizes understanding; denies any other questions or concerns; instructed to call back for any new or worsening symptoms.    Patient/Caller agrees with recommended disposition; writer provided warm transfer to Maribell at [facility] for appointment scheduling for a new pt appt    Attention Provider:  Thank you for allowing me to participate in the care of your patient.  The patient was connected to triage in response to information provided to the [facility].  Please do not respond through this encounter as the response is not directed to a shared pool.      Reason for Disposition   Nasal discharge present > 10 days    Protocols used: Nasal Allergies (Hay Fever)-ADULT-[place]

## 2024-04-09 ENCOUNTER — TELEPHONE (OUTPATIENT)
Dept: FAMILY MEDICINE CLINIC | Age: 34
End: 2024-04-09

## 2024-04-09 ENCOUNTER — OFFICE VISIT (OUTPATIENT)
Dept: FAMILY MEDICINE CLINIC | Age: 34
End: 2024-04-09

## 2024-04-09 VITALS
DIASTOLIC BLOOD PRESSURE: 72 MMHG | WEIGHT: 197 LBS | HEART RATE: 87 BPM | RESPIRATION RATE: 16 BRPM | HEIGHT: 69 IN | OXYGEN SATURATION: 97 % | BODY MASS INDEX: 29.18 KG/M2 | SYSTOLIC BLOOD PRESSURE: 110 MMHG

## 2024-04-09 DIAGNOSIS — F33.0 MILD EPISODE OF RECURRENT MAJOR DEPRESSIVE DISORDER (HCC): ICD-10-CM

## 2024-04-09 DIAGNOSIS — Z13.1 DIABETES MELLITUS SCREENING: ICD-10-CM

## 2024-04-09 DIAGNOSIS — Z76.89 ENCOUNTER TO ESTABLISH CARE WITH NEW DOCTOR: ICD-10-CM

## 2024-04-09 DIAGNOSIS — Z00.00 ANNUAL PHYSICAL EXAM: Primary | ICD-10-CM

## 2024-04-09 DIAGNOSIS — Z13.220 SCREENING CHOLESTEROL LEVEL: ICD-10-CM

## 2024-04-09 DIAGNOSIS — L30.9 ECZEMA, UNSPECIFIED TYPE: ICD-10-CM

## 2024-04-09 DIAGNOSIS — J30.9 ALLERGIC RHINITIS, UNSPECIFIED SEASONALITY, UNSPECIFIED TRIGGER: ICD-10-CM

## 2024-04-09 RX ORDER — CETIRIZINE HYDROCHLORIDE 10 MG/1
10 TABLET ORAL DAILY
Qty: 30 TABLET | Refills: 0 | Status: SHIPPED | OUTPATIENT
Start: 2024-04-09 | End: 2024-04-09 | Stop reason: SDUPTHER

## 2024-04-09 RX ORDER — TRIAMCINOLONE ACETONIDE 1 MG/G
OINTMENT TOPICAL 2 TIMES DAILY
Qty: 30 G | Refills: 1 | Status: SHIPPED | OUTPATIENT
Start: 2024-04-09 | End: 2024-04-16

## 2024-04-09 RX ORDER — CETIRIZINE HYDROCHLORIDE 10 MG/1
10 TABLET ORAL DAILY
Qty: 30 TABLET | Refills: 0 | Status: SHIPPED | OUTPATIENT
Start: 2024-04-09

## 2024-04-09 SDOH — ECONOMIC STABILITY: FOOD INSECURITY: WITHIN THE PAST 12 MONTHS, THE FOOD YOU BOUGHT JUST DIDN'T LAST AND YOU DIDN'T HAVE MONEY TO GET MORE.: NEVER TRUE

## 2024-04-09 SDOH — ECONOMIC STABILITY: FOOD INSECURITY: WITHIN THE PAST 12 MONTHS, YOU WORRIED THAT YOUR FOOD WOULD RUN OUT BEFORE YOU GOT MONEY TO BUY MORE.: NEVER TRUE

## 2024-04-09 SDOH — ECONOMIC STABILITY: INCOME INSECURITY: HOW HARD IS IT FOR YOU TO PAY FOR THE VERY BASICS LIKE FOOD, HOUSING, MEDICAL CARE, AND HEATING?: NOT HARD AT ALL

## 2024-04-09 SDOH — ECONOMIC STABILITY: HOUSING INSECURITY
IN THE LAST 12 MONTHS, WAS THERE A TIME WHEN YOU DID NOT HAVE A STEADY PLACE TO SLEEP OR SLEPT IN A SHELTER (INCLUDING NOW)?: NO

## 2024-04-09 ASSESSMENT — PATIENT HEALTH QUESTIONNAIRE - PHQ9
SUM OF ALL RESPONSES TO PHQ QUESTIONS 1-9: 5
8. MOVING OR SPEAKING SO SLOWLY THAT OTHER PEOPLE COULD HAVE NOTICED. OR THE OPPOSITE, BEING SO FIGETY OR RESTLESS THAT YOU HAVE BEEN MOVING AROUND A LOT MORE THAN USUAL: NOT AT ALL
6. FEELING BAD ABOUT YOURSELF - OR THAT YOU ARE A FAILURE OR HAVE LET YOURSELF OR YOUR FAMILY DOWN: NOT AT ALL
SUM OF ALL RESPONSES TO PHQ QUESTIONS 1-9: 5
SUM OF ALL RESPONSES TO PHQ QUESTIONS 1-9: 5
7. TROUBLE CONCENTRATING ON THINGS, SUCH AS READING THE NEWSPAPER OR WATCHING TELEVISION: NOT AT ALL
3. TROUBLE FALLING OR STAYING ASLEEP: SEVERAL DAYS
5. POOR APPETITE OR OVEREATING: SEVERAL DAYS
1. LITTLE INTEREST OR PLEASURE IN DOING THINGS: SEVERAL DAYS
10. IF YOU CHECKED OFF ANY PROBLEMS, HOW DIFFICULT HAVE THESE PROBLEMS MADE IT FOR YOU TO DO YOUR WORK, TAKE CARE OF THINGS AT HOME, OR GET ALONG WITH OTHER PEOPLE: NOT DIFFICULT AT ALL
4. FEELING TIRED OR HAVING LITTLE ENERGY: SEVERAL DAYS
SUM OF ALL RESPONSES TO PHQ QUESTIONS 1-9: 5
9. THOUGHTS THAT YOU WOULD BE BETTER OFF DEAD, OR OF HURTING YOURSELF: NOT AT ALL
SUM OF ALL RESPONSES TO PHQ9 QUESTIONS 1 & 2: 2
2. FEELING DOWN, DEPRESSED OR HOPELESS: SEVERAL DAYS

## 2024-04-09 ASSESSMENT — ENCOUNTER SYMPTOMS
SHORTNESS OF BREATH: 0
COUGH: 0
RHINORRHEA: 1
CONSTIPATION: 0
DIARRHEA: 0
WHEEZING: 0
SORE THROAT: 0

## 2024-04-09 NOTE — PROGRESS NOTES
Subjective     Patient ID: Estefanía is a 34 y.o. male who presents for New Patient (New patient. ), Skin Problem (Raised spots on back arms, legs and nose. Coming and going for 1.5 years. Has used things in the past prescribed by other doctors. Does not know the names. Hospital told him to see a dermatologist. ), and Epistaxis (Nasal congestion and bloody nose at night. ).     Presents to [facility] care.  No PMH.  Reports that he has rashes throughout his body for 1.5years.  Rash burns.  Has tried to putting a cream on it in past, but can't remember what it was called.  Cream did not help.  Reprots that he originally used any type of soap, but now only7 uses a soap called \"Idol\". Since switching to Idol soap, he has noticed a slight improvement in rash.  Only using this soap for 15 days. When rash first started, he was living in [place], but then rash started prior to moving to [place].           Review of Systems   Constitutional:  Negative for activity change, appetite change and fever.   HENT:  Positive for rhinorrhea. Negative for congestion and sore throat.    Eyes:         Itchy/watery eyes   Respiratory:  Negative for cough, shortness of breath and wheezing.    Cardiovascular:  Negative for chest pain, palpitations and leg swelling.   Gastrointestinal:  Negative for constipation and diarrhea.   Skin:  Positive for rash.   Allergic/Immunologic: Positive for environmental allergies.   All other systems reviewed and are negative.       Objective   Vitals:    04/09/24 1505   BP: 110/72   Pulse: 87   Resp: 16   SpO2: 97%       Physical Exam  Vitals and nursing note reviewed.   Constitutional:       General: He is not in acute distress.     Appearance: Normal appearance. He is not ill-appearing, toxic-appearing or diaphoretic.   HENT:      Head: Normocephalic and atraumatic.      Nose: Nose normal.      Mouth/Throat:      Mouth: Mucous membranes are moist.      Pharynx: Oropharynx is clear.   Eyes:      Extraocular Movements:

## 2024-04-10 LAB
ALBUMIN SERPL-MCNC: 4.7 G/DL (ref 3.4–5)
ALBUMIN/GLOB SERPL: 1.5 {RATIO} (ref 1.1–2.2)
ALP SERPL-CCNC: 30 U/L (ref 40–129)
ALT SERPL-CCNC: 21 U/L (ref 10–40)
ANION GAP SERPL CALCULATED.3IONS-SCNC: 8 MMOL/L (ref 3–16)
AST SERPL-CCNC: 22 U/L (ref 15–37)
BASOPHILS # BLD: 0 K/UL (ref 0–0.2)
BASOPHILS NFR BLD: 0.4 %
BILIRUB SERPL-MCNC: 0.3 MG/DL (ref 0–1)
BUN SERPL-MCNC: 15 MG/DL (ref 7–20)
CALCIUM SERPL-MCNC: 9.9 MG/DL (ref 8.3–10.6)
CHLORIDE SERPL-SCNC: 103 MMOL/L (ref 99–110)
CHOLEST SERPL-MCNC: 117 MG/DL (ref 0–199)
CO2 SERPL-SCNC: 29 MMOL/L (ref 21–32)
CREAT SERPL-MCNC: 1.4 MG/DL (ref 0.9–1.3)
DEPRECATED RDW RBC AUTO: 14.6 % (ref 12.4–15.4)
EOSINOPHIL # BLD: 0.1 K/UL (ref 0–0.6)
EOSINOPHIL NFR BLD: 2.3 %
EST. AVERAGE GLUCOSE BLD GHB EST-MCNC: 125.5 MG/DL
GFR SERPLBLD CREATININE-BSD FMLA CKD-EPI: 67 ML/MIN/{1.73_M2}
GLUCOSE SERPL-MCNC: 96 MG/DL (ref 70–99)
HBA1C MFR BLD: 6 %
HCT VFR BLD AUTO: 42.9 % (ref 40.5–52.5)
HDLC SERPL-MCNC: 38 MG/DL (ref 40–60)
HGB BLD-MCNC: 14.2 G/DL (ref 13.5–17.5)
LDLC SERPL CALC-MCNC: 58 MG/DL
LYMPHOCYTES # BLD: 1.3 K/UL (ref 1–5.1)
LYMPHOCYTES NFR BLD: 33.4 %
MCH RBC QN AUTO: 27 PG (ref 26–34)
MCHC RBC AUTO-ENTMCNC: 33.1 G/DL (ref 31–36)
MCV RBC AUTO: 81.8 FL (ref 80–100)
MONOCYTES # BLD: 0.5 K/UL (ref 0–1.3)
MONOCYTES NFR BLD: 11.7 %
NEUTROPHILS # BLD: 2.1 K/UL (ref 1.7–7.7)
NEUTROPHILS NFR BLD: 52.2 %
PLATELET # BLD AUTO: 205 K/UL (ref 135–450)
PMV BLD AUTO: 11 FL (ref 5–10.5)
POTASSIUM SERPL-SCNC: 4.4 MMOL/L (ref 3.5–5.1)
PROT SERPL-MCNC: 7.8 G/DL (ref 6.4–8.2)
RBC # BLD AUTO: 5.24 M/UL (ref 4.2–5.9)
SLIDE REVIEW: ABNORMAL
SODIUM SERPL-SCNC: 140 MMOL/L (ref 136–145)
TRIGL SERPL-MCNC: 103 MG/DL (ref 0–150)
VLDLC SERPL CALC-MCNC: 21 MG/DL
WBC # BLD AUTO: 4 K/UL (ref 4–11)

## 2024-07-09 ENCOUNTER — OFFICE VISIT (OUTPATIENT)
Dept: FAMILY MEDICINE CLINIC | Age: 34
End: 2024-07-09
Payer: COMMERCIAL

## 2024-07-09 VITALS
BODY MASS INDEX: 30.51 KG/M2 | SYSTOLIC BLOOD PRESSURE: 128 MMHG | HEIGHT: 69 IN | DIASTOLIC BLOOD PRESSURE: 82 MMHG | OXYGEN SATURATION: 97 % | RESPIRATION RATE: 16 BRPM | HEART RATE: 75 BPM | WEIGHT: 206 LBS

## 2024-07-09 DIAGNOSIS — J30.9 ALLERGIC RHINITIS, UNSPECIFIED SEASONALITY, UNSPECIFIED TRIGGER: ICD-10-CM

## 2024-07-09 DIAGNOSIS — L30.9 ECZEMA, UNSPECIFIED TYPE: Primary | ICD-10-CM

## 2024-07-09 PROCEDURE — 99214 OFFICE O/P EST MOD 30 MIN: CPT | Performed by: STUDENT IN AN ORGANIZED HEALTH CARE EDUCATION/TRAINING PROGRAM

## 2024-07-09 RX ORDER — FLUTICASONE PROPIONATE 50 MCG
1 SPRAY, SUSPENSION (ML) NASAL DAILY
Qty: 32 G | Refills: 5 | Status: SHIPPED | OUTPATIENT
Start: 2024-07-09

## 2024-07-09 RX ORDER — CETIRIZINE HYDROCHLORIDE 10 MG/1
10 TABLET ORAL DAILY
Qty: 30 TABLET | Refills: 5 | Status: SHIPPED | OUTPATIENT
Start: 2024-07-09

## 2024-07-09 RX ORDER — TRIAMCINOLONE ACETONIDE 1 MG/G
OINTMENT TOPICAL 2 TIMES DAILY
Qty: 80 G | Refills: 2 | Status: SHIPPED | OUTPATIENT
Start: 2024-07-09 | End: 2024-07-16

## 2024-07-09 NOTE — PROGRESS NOTES
Subjective     Patient ID: Estefanía is a 34 y.o. male who presents for 3 Month Follow-Up (3 month follow up on rash./The one on foot is better. Still has rash on nose and skin. /Fells congested at night but no mucus. Just swollen tissue. ).     Patient speaks [place]-Creole, so video  used during exam to help interpret and give patient history.     Eczema -- steroid cream was working well, but did not have any refills of it and said that he ran out of it fairly quickly.  Now he still has same rash and even has some skin discoloration noted on the rash on his legs.  Some areas are darker in color, and some areas lightened.  He is requesting to go to a dermatologist to be seen and keeps requesting an oral medication to stop the rash from happening.     Allergies -- was started on zyrtec at last visit, which worked well, but also did not get any refills and did not call to get refills.  Now having allergy symptoms again since not taking any antihistamines.     Congestion -- says that he has had increased nasal congestion that is worse at night.  Says that he did not have this issue at all when he when he was started on zyrtec, so unsure if zyrtec was helpful with this symptom.   Sometimes he feels so congested that he will try to pick his nose and then will notice some blood.  Congestion will vary between right and left nostril.          Review of Systems   Constitutional:  Negative for activity change, appetite change and fever.   HENT:  Positive for congestion. Negative for rhinorrhea and sore throat.    Respiratory:  Negative for cough, shortness of breath and wheezing.    Gastrointestinal:  Negative for constipation and diarrhea.   Skin:  Positive for rash.   Allergic/Immunologic: Positive for environmental allergies.   All other systems reviewed and are negative.       Objective   Vitals:    07/09/24 1615   BP: 128/82   Pulse: 75   Resp: 16   SpO2: 97%       Physical Exam  Vitals and nursing note reviewed.

## 2024-07-10 ASSESSMENT — ENCOUNTER SYMPTOMS
RHINORRHEA: 0
WHEEZING: 0
CONSTIPATION: 0
SHORTNESS OF BREATH: 0
DIARRHEA: 0
COUGH: 0
SORE THROAT: 0

## 2024-07-10 NOTE — ASSESSMENT & PLAN NOTE
-refilled kenalog ointment and put in for larger tube  -recommended that he does not use cream for more than 2 weeks at a time due to potential for thin skinning and scaring  -referral placed to dermatology